# Patient Record
Sex: FEMALE | Race: WHITE | NOT HISPANIC OR LATINO | Employment: STUDENT | URBAN - METROPOLITAN AREA
[De-identification: names, ages, dates, MRNs, and addresses within clinical notes are randomized per-mention and may not be internally consistent; named-entity substitution may affect disease eponyms.]

---
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
## 2021-04-04 ENCOUNTER — HEALTH MAINTENANCE LETTER (OUTPATIENT)
Age: 21
End: 2021-04-04

## 2021-09-18 ENCOUNTER — HEALTH MAINTENANCE LETTER (OUTPATIENT)
Age: 21
End: 2021-09-18

## 2021-09-29 ENCOUNTER — VIRTUAL VISIT (OUTPATIENT)
Dept: FAMILY MEDICINE | Facility: CLINIC | Age: 21
End: 2021-09-29
Payer: COMMERCIAL

## 2021-09-29 DIAGNOSIS — N30.00 ACUTE CYSTITIS WITHOUT HEMATURIA: Primary | ICD-10-CM

## 2021-09-29 PROCEDURE — 99213 OFFICE O/P EST LOW 20 MIN: CPT | Mod: GT | Performed by: NURSE PRACTITIONER

## 2021-09-29 RX ORDER — NITROFURANTOIN 25; 75 MG/1; MG/1
100 CAPSULE ORAL 2 TIMES DAILY
Qty: 14 CAPSULE | Refills: 0 | Status: SHIPPED | OUTPATIENT
Start: 2021-09-29 | End: 2022-03-02

## 2022-02-25 ENCOUNTER — TRANSFERRED RECORDS (OUTPATIENT)
Dept: HEALTH INFORMATION MANAGEMENT | Facility: CLINIC | Age: 22
End: 2022-02-25
Payer: COMMERCIAL

## 2022-03-01 ENCOUNTER — TELEPHONE (OUTPATIENT)
Dept: FAMILY MEDICINE | Facility: CLINIC | Age: 22
End: 2022-03-01
Payer: COMMERCIAL

## 2022-03-01 NOTE — TELEPHONE ENCOUNTER
Last office visit ws on 9/29/21 for cystitis, otherwise 11/19/19 for physical.    Patient will need an office visit.  We do not have a current AILYN on file to talk to mom.  Last one was 2013 when she was a minor.     Called patient   [phone] (home).  Left message to return call on voicemail to [phone]    Called mom Rio and advised that we do not have an AILYN for her. Did not give out any information.  Just gathered information.  She asked if we could just schedule an appointment for her at [facility].    Appointment made for tomorrow.    Katelynn RN,BSN  Triage Nurse  [facility]: [facility]  Ph: [phone]

## 2022-03-01 NOTE — TELEPHONE ENCOUNTER
Mom calling, patient has herniated disc in back and seeing chiropractor. Mom asking if provider can prescribe something to help patient sleep at night(muscle relaxer). Mom will have chiropractor office fax xray reports faxed to clinic.

## 2022-03-02 ENCOUNTER — OFFICE VISIT (OUTPATIENT)
Dept: FAMILY MEDICINE | Facility: CLINIC | Age: 22
End: 2022-03-02
Payer: COMMERCIAL

## 2022-03-02 VITALS
BODY MASS INDEX: 19.21 KG/M2 | SYSTOLIC BLOOD PRESSURE: 114 MMHG | OXYGEN SATURATION: 98 % | HEIGHT: 64 IN | HEART RATE: 92 BPM | DIASTOLIC BLOOD PRESSURE: 70 MMHG | TEMPERATURE: 98.5 F | RESPIRATION RATE: 15 BRPM | WEIGHT: 112.5 LBS

## 2022-03-02 DIAGNOSIS — M54.42 CHRONIC BILATERAL LOW BACK PAIN WITH LEFT-SIDED SCIATICA: Primary | ICD-10-CM

## 2022-03-02 DIAGNOSIS — G89.29 CHRONIC BILATERAL LOW BACK PAIN WITH LEFT-SIDED SCIATICA: Primary | ICD-10-CM

## 2022-03-02 PROCEDURE — 99213 OFFICE O/P EST LOW 20 MIN: CPT | Performed by: PHYSICIAN ASSISTANT

## 2022-03-02 NOTE — PATIENT INSTRUCTIONS
For the first few days, use ice several times a day  - after that, can use heat in the morning, ice at night  Exercises and physical therapy referral, continue with chiropractor  - stretching and core strength  Ibuprofen 600-800 mg up to 3 times a day  Tizanidine muscle relaxer - can make you sleepy. Try at night.  Follow up if worsening or if not improving     Let me know if not improving, consider MRI  Please follow up sooner if symptoms worsen

## 2022-03-02 NOTE — PROGRESS NOTES
Assessment & Plan     ASSESSMENT/PLAN:      ICD-10-CM    1. Chronic bilateral low back pain with left-sided sciatica  M54.42 Physical Therapy Referral    G89.29 tiZANidine (ZANAFLEX) 4 MG tablet     Reviewed chiropractic notes, will get scanned into the system.  She would like to continue working with chiropractor, is also willing to trial physical therapy due to the muscular pain. We discussed spine specialist follow up, MRI- she defers these today but will let me know if she would like to pursue this (discussed MRI questions, all negative)    Patient Instructions   For the first few days, use ice several times a day  - after that, can use heat in the morning, ice at night  Exercises and physical therapy referral, continue with chiropractor  - stretching and core strength  Ibuprofen 600-800 mg up to 3 times a day  Tizanidine muscle relaxer - can make you sleepy. Try at night.  Follow up if worsening or if not improving     Let me know if not improving, consider MRI  Please follow up sooner if symptoms worsen    Return in about 2 weeks (around 3/16/2022) for if not improving or if worsening.    SHELIA Mota [facility] BETY Covarrubias is a 21 year old who presents for the following health issues     HPI     Concern - Low back pain  Onset: She had a previous injury to back 2 years ago, but just last week she woke up with pain in middle low back that has been constant  Description: Sharp stabbing pain the is constant  Intensity: moderate, 6/10 today but has been up to 8/10  Progression of Symptoms:  improving slightly  Accompanying Signs & Symptoms: Pain that radiates down into both legs  Previous history of similar problem: Yes from previous back injury 2 years ago  Precipitating factors:        Worsened by: Sitting has been the worst, not able to bend down to lift anything, twisting and bending  Alleviating factors:        Improved by: Ice and ibuprofen with little  "relief  Therapies tried and outcome:  Ice and ibuprofen with little relief    She picked up a duffel and felt immediate pain/pop, had similar back/leg pain at the time. She has flares of pain about once a month since then, but not this bad. . If she sits too long always has sciatica - bilateral, down to knees sometimes to toes on side of leg, more pain than tingling.  Saw chiro last week, had x-ray - they told her L1 herniation  This flare she just woke up pain, slept wrong.    She also notes muscle spasms    Patient denies red flag signs such as bowel/bladder changes, paresthesias in groin, radiation, weakness, history of cancer or IV drug use       Review of Systems   Other than noted above, general, HEENT, respiratory, cardiac, MS, and gastrointestinal systems are negative.       Objective    /70   Pulse 92   Temp 98.5  F (36.9  C) (Tympanic)   Resp 15   Ht 1.613 m (5' 3.5\")   Wt 51 kg (112 lb 8 oz)   SpO2 98%   BMI 19.61 kg/m    Body mass index is 19.61 kg/m .  Physical Exam   GENERAL: healthy, alert and no distress  RESP: lungs clear to auscultation - no rales, rhonchi or wheezes  CV: regular rate and rhythm, normal S1 S2, no S3 or S4, no murmur, click or rub, no peripheral edema and peripheral pulses strong  ABDOMEN: soft, nontender, no hepatosplenomegaly, no masses and bowel sounds normal  MS: no gross musculoskeletal defects noted, no edema  Comprehensive back pain exam:  Tenderness of L1 area over spine. left sided upper paralumbar muscles, Pain limits the following motions: all, Lower extremity strength functional and equal on both sides, Lower extremity reflexes within normal limits bilaterally, Lower extremity sensation normal and equal on both sides and Straight leg raise negative bilaterally            "

## 2022-04-30 ENCOUNTER — HEALTH MAINTENANCE LETTER (OUTPATIENT)
Age: 22
End: 2022-04-30

## 2022-06-01 ENCOUNTER — TELEPHONE (OUTPATIENT)
Dept: FAMILY MEDICINE | Facility: CLINIC | Age: 22
End: 2022-06-01
Payer: COMMERCIAL

## 2022-06-01 NOTE — TELEPHONE ENCOUNTER
Patient Quality Outreach      Summary:    Patient has the following on her problem list/HM: None    Patient is due/failing the following:   Physical  - Due Now  Chlamydia    Type of outreach:    Sent [employer] message.    Questions for provider review:    None                                                                                                                                     Sarah Estevez [facility]     Chart routed to Care Team.

## 2022-06-07 NOTE — PROGRESS NOTES
Marci is a 21 year old who is being evaluated via a billable telephone visit.      What phone number would you like to be contacted at? [phone]  How would you like to obtain your AVS? Mary    Assessment & Plan     (N30.00) Acute cystitis without hematuria  (primary encounter diagnosis)  Comment: already took AZO so UA would be inaccurate. Recommend treatment for presumed UTI.  Plan: nitroFURantoin macrocrystal-monohydrate         (MACROBID) 100 MG capsule  Advised patient that if symptoms do not improve or worsen to call the clinic back and she will need to provide a urine.      Prescription drug management  10 minutes spent on the date of the encounter doing chart review, history and exam, documentation and further activities per the note         No follow-ups on file.    GEORGE Tovar [facility]    Debbie Covarrubias is a 21 year old who presents for the following health issues     HPI     Genitourinary - Female  Onset/Duration: since yesterday   Description:   Painful urination (Dysuria): YES           Frequency: YES  Blood in urine (Hematuria): no  Delay in urine (Hesitency): no  Intensity: moderate  Progression of Symptoms:  improving  Accompanying Signs & Symptoms:  Fever/chills: no  Flank pain: no  Nausea and vomiting: no  Vaginal symptoms: none  Abdominal/Pelvic Pain: no  History:   History of frequent UTI s: no  History of kidney stones: no  Sexually Active: YES  Possibility of pregnancy: No  Precipitating or alleviating factors: AZO makes symptoms better   Therapies tried and outcome: Cranberry juice prn (contraindicated in Coumadin patients) and  AZO    No fever or flank pain.     Review of Systems   Constitutional, HEENT, cardiovascular, pulmonary, gi and gu systems are negative, except as otherwise noted.      Objective           Vitals:  No vitals were obtained today due to virtual visit.    Physical Exam   healthy, alert and no distress  PSYCH:  Alert and oriented times 3; coherent speech, normal   rate and volume, able to articulate logical thoughts, able   to abstract reason, no tangential thoughts, no hallucinations   or delusions  Her affect is normal  RESP: No cough, no audible wheezing, able to talk in full sentences  Remainder of exam unable to be completed due to telephone visits    Office Visit on 11/19/2019   Component Date Value Ref Range Status     Specimen Descrip 11/19/2019 Urine   Final     N Gonorrhea PCR 11/19/2019 Negative  NEG^Negative Final    Comment: Negative for N. gonorrhoeae rRNA by transcription mediated amplification.  A negative result by transcription mediated amplification does not preclude   the presence of N. gonorrhoeae infection because results are dependent on   proper and adequate collection, absence of inhibitors, and sufficient rRNA to   be detected.       Specimen Description 11/19/2019 Urine   Final     Chlamydia Trachomatis PCR 11/19/2019 Negative  NEG^Negative Final    Comment: Negative for C. trachomatis rRNA by transcription mediated amplification.  A negative result by transcription mediated amplification does not preclude   the presence of C. trachomatis infection because results are dependent on   proper and adequate collection, absence of inhibitors, and sufficient rRNA to   be detected.                   Phone call duration: 5 minutes   Traveling alone Billing Type: Third-Party Bill Bill For Surgical Tray: no Performing Laboratory: [phone] Expected Date Of Service: 06/07/2022

## 2022-11-20 ENCOUNTER — HEALTH MAINTENANCE LETTER (OUTPATIENT)
Age: 22
End: 2022-11-20

## 2022-11-30 ENCOUNTER — NURSE TRIAGE (OUTPATIENT)
Dept: NURSING | Facility: CLINIC | Age: 22
End: 2022-11-30

## 2022-11-30 ENCOUNTER — OFFICE VISIT (OUTPATIENT)
Dept: FAMILY MEDICINE | Facility: CLINIC | Age: 22
End: 2022-11-30
Payer: COMMERCIAL

## 2022-11-30 VITALS
DIASTOLIC BLOOD PRESSURE: 82 MMHG | WEIGHT: 128.9 LBS | HEART RATE: 113 BPM | SYSTOLIC BLOOD PRESSURE: 124 MMHG | RESPIRATION RATE: 26 BRPM | TEMPERATURE: 101.7 F | OXYGEN SATURATION: 95 % | BODY MASS INDEX: 22.47 KG/M2

## 2022-11-30 DIAGNOSIS — J02.9 SORE THROAT: ICD-10-CM

## 2022-11-30 DIAGNOSIS — J10.1 INFLUENZA A: Primary | ICD-10-CM

## 2022-11-30 LAB
DEPRECATED S PYO AG THROAT QL EIA: NEGATIVE
FLUAV AG SPEC QL IA: POSITIVE
FLUBV AG SPEC QL IA: NEGATIVE
GROUP A STREP BY PCR: NOT DETECTED

## 2022-11-30 PROCEDURE — 87804 INFLUENZA ASSAY W/OPTIC: CPT | Mod: 59 | Performed by: FAMILY MEDICINE

## 2022-11-30 PROCEDURE — 87804 INFLUENZA ASSAY W/OPTIC: CPT | Performed by: FAMILY MEDICINE

## 2022-11-30 PROCEDURE — 87651 STREP A DNA AMP PROBE: CPT | Performed by: FAMILY MEDICINE

## 2022-11-30 PROCEDURE — 99213 OFFICE O/P EST LOW 20 MIN: CPT | Performed by: FAMILY MEDICINE

## 2022-11-30 NOTE — PATIENT INSTRUCTIONS
You have influenza A. Also known as the flu.    Tylenol and ibuprofen for fever/aches    Should improve in the period of 3-5 days typically  Fluids and rest and time.    I will call if strep positive.

## 2022-11-30 NOTE — PROGRESS NOTES
Assessment & Plan     Sore throat  pending  - Streptococcus A Rapid Screen w/Reflex to PCR - Clinic Collect    Influenza A  positive             No follow-ups on file.    Bay Johnston MD  [facility] AIDA Covarrubias is a 22 year old female who presents to clinic today for the following health issues:  Chief Complaint   Patient presents with     Cough     X Monday. Congestion, SOB, chest pain/tightness. Vomited in lobby earlier.      Ear Problem     Both ears x yesterday. No drainage. Headaches.     Fever     On/off x Monday. Chills and body aches. Ibuprofen taken at 7 AM today.     HPI    Here with fever and cough and ST.  Started Monday.  Ibuprofen. Cold and flu medicine.  Headache/ear pain.  Cough drops.        Review of Systems        Objective    /82 (BP Location: Right arm, Patient Position: Sitting, Cuff Size: Adult Regular)   Pulse 113   Temp (!) 101.7  F (38.7  C) (Oral)   Resp 26   Wt 58.5 kg (128 lb 14.4 oz)   LMP 11/05/2022 (Exact Date)   SpO2 95%   BMI 22.47 kg/m    Physical Exam  Vitals and nursing note reviewed.   Constitutional:       Appearance: She is diaphoretic.   HENT:      Right Ear: Tympanic membrane normal.      Left Ear: Tympanic membrane normal.      Mouth/Throat:      Mouth: Mucous membranes are moist.      Pharynx: Posterior oropharyngeal erythema present.   Eyes:      Pupils: Pupils are equal, round, and reactive to light.   Cardiovascular:      Rate and Rhythm: Normal rate and regular rhythm.      Pulses: Normal pulses.      Heart sounds: Normal heart sounds.   Pulmonary:      Effort: Pulmonary effort is normal.      Breath sounds: Normal breath sounds.   Neurological:      Mental Status: She is alert.

## 2022-11-30 NOTE — TELEPHONE ENCOUNTER
Mom calling. Patient heard on speaker phone. She's been sick started with sore throat, cough. Yesterday fever, body aches, lungs on fire. Temp now 99.1 throat's on fire, ear ache. No more diarrhea but used Imodium. I connected with scheduling for an appointment and advised urgent care if they can't get her in.  Kiley Charles RN  [facility]        Reason for Disposition    Earache  (Exceptions: brief ear pain of < 60 minutes duration, earache occurring during air travel    Additional Information    Negative: Moving the earlobe or touching the ear clearly increases the pain    Negative: Foreign body struck in the ear (e.g., bug, piece of cotton)    Negative: Followed an ear injury    Negative: [1] Recently diagnosed with otitis media AND [2] currently on oral antibiotics    Negative: [1] Stiff neck (can't touch chin to chest) AND [2] fever    Negative: [1] Bony area of skull behind the ear is pink or swollen AND [2] fever    Negative: Fever > 104 F (40 C)    Negative: Patient sounds very sick or weak to the triager    Negative: [1] SEVERE pain AND [2] not improved 2 hours after taking analgesic medication (e.g., ibuprofen or acetaminophen)     Pain at 7    Negative: Walking is very unsteady or feels very dizzy    Negative: Sudden onset of ear pain after long - thin object was inserted into the ear canal (e.g., pencil, Q-tip)    Negative: Diabetes mellitus or weak immune system (e.g., HIV positive, cancer chemo, splenectomy, organ transplant, chronic steroids)    Negative: New blurred vision or vision changes    Negative: White, yellow, or green discharge    Negative: Bloody discharge or unexplained bleeding from ear canal    Protocols used: EARACHE-A-AH

## 2023-03-15 ENCOUNTER — OFFICE VISIT (OUTPATIENT)
Dept: FAMILY MEDICINE | Facility: CLINIC | Age: 23
End: 2023-03-15
Payer: COMMERCIAL

## 2023-03-15 VITALS
SYSTOLIC BLOOD PRESSURE: 110 MMHG | BODY MASS INDEX: 21.17 KG/M2 | HEIGHT: 64 IN | TEMPERATURE: 98.2 F | DIASTOLIC BLOOD PRESSURE: 72 MMHG | RESPIRATION RATE: 16 BRPM | HEART RATE: 84 BPM | OXYGEN SATURATION: 98 % | WEIGHT: 124 LBS

## 2023-03-15 DIAGNOSIS — R00.2 HEART PALPITATIONS: ICD-10-CM

## 2023-03-15 DIAGNOSIS — Z00.00 HEALTH CARE MAINTENANCE: Primary | ICD-10-CM

## 2023-03-15 PROCEDURE — 99213 OFFICE O/P EST LOW 20 MIN: CPT | Mod: 25 | Performed by: NURSE PRACTITIONER

## 2023-03-15 PROCEDURE — 90471 IMMUNIZATION ADMIN: CPT | Performed by: NURSE PRACTITIONER

## 2023-03-15 PROCEDURE — 90715 TDAP VACCINE 7 YRS/> IM: CPT | Performed by: NURSE PRACTITIONER

## 2023-03-15 PROCEDURE — 93000 ELECTROCARDIOGRAM COMPLETE: CPT | Performed by: NURSE PRACTITIONER

## 2023-03-15 PROCEDURE — 99395 PREV VISIT EST AGE 18-39: CPT | Mod: 25 | Performed by: NURSE PRACTITIONER

## 2023-03-15 ASSESSMENT — PAIN SCALES - GENERAL: PAINLEVEL: NO PAIN (0)

## 2023-03-15 ASSESSMENT — ENCOUNTER SYMPTOMS
PARESTHESIAS: 0
ABDOMINAL PAIN: 0
CHILLS: 0
PALPITATIONS: 1
CONSTIPATION: 0
JOINT SWELLING: 0
HEARTBURN: 0
HEMATOCHEZIA: 0
BREAST MASS: 0
NAUSEA: 0
DIZZINESS: 0
FREQUENCY: 0
FEVER: 0
MYALGIAS: 0
DYSURIA: 0
NERVOUS/ANXIOUS: 0
SORE THROAT: 0
DIARRHEA: 0
HEMATURIA: 0
HEADACHES: 0
EYE PAIN: 0
SHORTNESS OF BREATH: 0
WEAKNESS: 0
ARTHRALGIAS: 0
COUGH: 0

## 2023-03-15 ASSESSMENT — ANXIETY QUESTIONNAIRES
GAD7 TOTAL SCORE: 7
2. NOT BEING ABLE TO STOP OR CONTROL WORRYING: SEVERAL DAYS
1. FEELING NERVOUS, ANXIOUS, OR ON EDGE: SEVERAL DAYS
3. WORRYING TOO MUCH ABOUT DIFFERENT THINGS: SEVERAL DAYS
GAD7 TOTAL SCORE: 7
5. BEING SO RESTLESS THAT IT IS HARD TO SIT STILL: SEVERAL DAYS
6. BECOMING EASILY ANNOYED OR IRRITABLE: SEVERAL DAYS
7. FEELING AFRAID AS IF SOMETHING AWFUL MIGHT HAPPEN: SEVERAL DAYS

## 2023-03-15 ASSESSMENT — PATIENT HEALTH QUESTIONNAIRE - PHQ9: 5. POOR APPETITE OR OVEREATING: SEVERAL DAYS

## 2023-03-15 NOTE — ASSESSMENT & PLAN NOTE
For past 6 months, HR can be up to 130s and last 4 hours. Slight shortness of breath with it, anxiety.   Holter monitor and EKG

## 2023-03-15 NOTE — PROGRESS NOTES
SUBJECTIVE:   CC: Marci is an 22 year old who presents for preventive health visit.   Patient has been advised of split billing requirements and indicates understanding: Yes  Healthy Habits:     Getting at least 3 servings of Calcium per day:  Yes    Bi-annual eye exam:  NO    Dental care twice a year:  Yes    Sleep apnea or symptoms of sleep apnea:  None    Diet:  Regular (no restrictions)    Frequency of exercise:  2-3 days/week    Duration of exercise:  15-30 minutes    Taking medications regularly:  Yes    Barriers to taking medications:  None    Medication side effects:  Not applicable    PHQ-2 Total Score: 0    Additional concerns today:  No         Heart palpatitations: intermittent episodes; can last 4 hours, HR up to 130s; a little short of breath last episode was Sunday. Took out caffeine but still has episodes of palpitations  Nutrition: everything, works in a resturant  Exercise: none   Physical active; works all day on her feet.  Stress: mange stress  Alcohol: very rare  Tobacco none  Sleep: 8 hours uninterrupted    Contraception: 1 partner in her life for past 5-6 years. Uses condoms. Has tried Depo and OCPs which she didn't like. Had difficulty taking a daily pill. She didn't like how she felt on the hormonal contraception. Discussed other options but for now she is declining the contraception.    Today's PHQ-2 Score:   PHQ-2 ( 1999 Pf[employer]) 3/15/2023   Q1: Little interest or pleasure in doing things 0   Q2: Feeling down, depressed or hopeless 0   PHQ-2 Score 0   PHQ-2 Total Score (12-17 Years)- Positive if 3 or more points; Administer PHQ-A if positive -   Q1: Little interest or pleasure in doing things Not at all   Q2: Feeling down, depressed or hopeless Not at all   PHQ-2 Score 0       Have you ever done Advance Care Planning? (For example, a Health Directive, POLST, or a discussion with a medical provider or your loved ones about your wishes): NO    Social History     Tobacco Use     Smoking  status: Never     Smokeless tobacco: Never     Tobacco comments:     currently using e-cigarette   Substance Use Topics     Alcohol use: No     Alcohol/week: 0.0 standard drinks         Alcohol Use 3/15/2023   Prescreen: >3 drinks/day or >7 drinks/week? No   No flowsheet data found.    Reviewed orders with patient.  Reviewed health maintenance and updated orders accordingly - Yes  Patient had declined Pap smear, HIV testing, Hepatitis C, gonorrhea, chlamydia testing    Breast Cancer Screening: no family history  MGF: MI, half sister has heart arythema      History of abnormal Pap smear: NO - age 21-29 PAP every 3 years recommended     Reviewed and updated as needed this visit by clinical staff    Allergies  Meds  Problems             Reviewed and updated as needed this visit by Provider      Problems            No past medical history on file.   No past surgical history on file.  OB History   No obstetric history on file.       Review of Systems   Constitutional: Negative for chills and fever.   HENT: Negative for congestion, ear pain, hearing loss and sore throat.    Eyes: Negative for pain and visual disturbance.   Respiratory: Negative for cough and shortness of breath.    Cardiovascular: Positive for palpitations. Negative for chest pain and peripheral edema.   Gastrointestinal: Negative for abdominal pain, constipation, diarrhea, heartburn, hematochezia and nausea.   Breasts:  Negative for tenderness, breast mass and discharge.   Genitourinary: Negative for dysuria, frequency, genital sores, hematuria, pelvic pain, urgency, vaginal bleeding and vaginal discharge.   Musculoskeletal: Negative for arthralgias, joint swelling and myalgias.   Skin: Negative for rash.   Neurological: Negative for dizziness, weakness, headaches and paresthesias.   Psychiatric/Behavioral: Negative for mood changes. The patient is not nervous/anxious.         OBJECTIVE:   /72 (BP Location: Right arm, Patient Position: Sitting,  "Cuff Size: Adult Regular)   Pulse 84   Temp 98.2  F (36.8  C) (Tympanic)   Resp 16   Ht 1.613 m (5' 3.5\")   Wt 56.2 kg (124 lb)   LMP 03/02/2023 (Approximate)   SpO2 98%   BMI 21.62 kg/m    Physical Exam  Vitals reviewed.   Constitutional:       General: She is not in acute distress.     Appearance: Normal appearance. She is well-developed. She is not ill-appearing.   HENT:      Head: Normocephalic and atraumatic.      Right Ear: Tympanic membrane and external ear normal.      Left Ear: Tympanic membrane and external ear normal.      Nose: Nose normal. No rhinorrhea.      Mouth/Throat:      Mouth: Mucous membranes are moist.      Pharynx: No oropharyngeal exudate.   Eyes:      General:         Right eye: No discharge.         Left eye: No discharge.      Extraocular Movements: Extraocular movements intact.      Conjunctiva/sclera: Conjunctivae normal.   Neck:      Thyroid: No thyromegaly.      Trachea: No tracheal deviation.   Cardiovascular:      Rate and Rhythm: Normal rate and regular rhythm.      Pulses: Normal pulses.      Heart sounds: Normal heart sounds, S1 normal and S2 normal. No murmur heard.    No friction rub. No S3 or S4 sounds.   Pulmonary:      Effort: Pulmonary effort is normal. No respiratory distress.      Breath sounds: Normal breath sounds. No wheezing or rales.   Chest:   Breasts:     Right: No inverted nipple, mass, nipple discharge or skin change.      Left: No inverted nipple, mass, nipple discharge or skin change.   Abdominal:      General: Bowel sounds are normal.      Palpations: Abdomen is soft. There is no mass.   Genitourinary:     Labia:         Right: No rash.         Left: No rash.       Vagina: Normal.      Cervix: Normal.   Musculoskeletal:         General: Normal range of motion.      Cervical back: Normal range of motion and neck supple.      Right lower leg: No edema.      Left lower leg: No edema.   Lymphadenopathy:      Cervical: No cervical adenopathy.      Upper " Body:      Right upper body: No supraclavicular or axillary adenopathy.      Left upper body: No supraclavicular or axillary adenopathy.   Skin:     General: Skin is warm and dry.      Capillary Refill: Capillary refill takes less than 2 seconds.      Findings: No rash.   Neurological:      General: No focal deficit present.      Mental Status: She is alert and oriented to person, place, and time.      Motor: No abnormal muscle tone.      Deep Tendon Reflexes: Reflexes are normal and symmetric.   Psychiatric:         Mood and Affect: Mood normal.         Behavior: Behavior normal.         Thought Content: Thought content normal.         Judgment: Judgment normal.           ASSESSMENT/PLAN:     Problem List Items Addressed This Visit        Circulatory    Heart palpitations     For past 6 months, HR can be up to 130s and last 4 hours. Slight shortness of breath with it, anxiety.   Holter monitor and EKG         Relevant Orders    Adult Leadless EKG Monitor 3 to 7 Days    EKG 12-lead complete w/read - Clinics (Completed)       Other    Health care maintenance - Primary     Patient has declined Pap smear, HIV testing, Hepatitic C, gonorrhea, chlamydia testing  Uses condoms for contraception  No family history of breast cancer  Family history of MGF with MI, half sister with a heart arhythmia             Patient has been advised of split billing requirements and indicates understanding: Yes      COUNSELING:  Reviewed preventive health counseling, as reflected in patient instructions       Regular exercise       Healthy diet/nutrition       Contraception       Consider Hep C screening for all patients one time for ages 18-79 years        She reports that she has never smoked. She has never used smokeless tobacco.      GEORGE Stallings CNP  [facility]

## 2023-03-15 NOTE — ASSESSMENT & PLAN NOTE
Patient has declined Pap smear, HIV testing, Hepatitic C, gonorrhea, chlamydia testing  Uses condoms for contraception  No family history of breast cancer  Family history of MGF with MI, half sister with a heart arhythmia

## 2023-03-25 ENCOUNTER — MYC MEDICAL ADVICE (OUTPATIENT)
Dept: FAMILY MEDICINE | Facility: CLINIC | Age: 23
End: 2023-03-25
Payer: COMMERCIAL

## 2023-03-27 NOTE — TELEPHONE ENCOUNTER
Patient called back.   Has been having the palpitations constantly for 2 days.   No chest pain or shortness of breath  No headaches  No weakness or numbness anywhere  Not brought on by anything. It starts happening randomly.  HR racing in the 100's  No new medications, supplements.   Not drinking anything with caffeine such as energy drinks, coffee, tea.     Please advise  Stephany Youssef RN on 3/27/2023 at 10:19 AM

## 2023-03-27 NOTE — TELEPHONE ENCOUNTER
RN tried to reach patient.   No answer.   LVM to call back to [phone].   Stephany Youssef RN on 3/27/2023 at 9:55 AM

## 2023-04-11 ENCOUNTER — HOSPITAL ENCOUNTER (OUTPATIENT)
Dept: CARDIOLOGY | Facility: CLINIC | Age: 23
Discharge: HOME | End: 2023-04-11
Attending: NURSE PRACTITIONER | Admitting: NURSE PRACTITIONER
Payer: COMMERCIAL

## 2023-04-11 DIAGNOSIS — R00.2 HEART PALPITATIONS: ICD-10-CM

## 2023-04-11 PROCEDURE — 93244 EXT ECG>48HR<7D REV&INTERPJ: CPT | Performed by: INTERNAL MEDICINE

## 2023-04-11 PROCEDURE — 93242 EXT ECG>48HR<7D RECORDING: CPT

## 2023-06-11 ENCOUNTER — NURSE TRIAGE (OUTPATIENT)
Dept: NURSING | Facility: CLINIC | Age: 23
End: 2023-06-11

## 2023-06-11 NOTE — TELEPHONE ENCOUNTER
Patient calling requesting to have medication from her e-visit sent to [facility] original pharmacy is closed today. Resent to requested new pharmacy in original e visit encounter.      Tyra Barajas RN BSN 6/11/2023 8:50 AM      Reason for Disposition    Health Information question, no triage required and triager able to answer question    Additional Information    Negative: [1] Caller is not with the adult (patient) AND [2] reporting urgent symptoms    Negative: Lab result questions    Negative: Medication questions    Negative: Caller can't be reached by phone    Negative: Caller has already spoken to PCP or another triager    Negative: RN needs further essential information from caller in order to complete triage    Negative: Requesting regular office appointment    Negative: [1] Caller requesting NON-URGENT health information AND [2] PCP's office is the best resource    Protocols used: INFORMATION ONLY CALL - [facility]AGE[facility]

## 2024-06-22 ENCOUNTER — HEALTH MAINTENANCE LETTER (OUTPATIENT)
Age: 24
End: 2024-06-22

## 2024-12-06 NOTE — NURSING NOTE
"Chief Complaint   Patient presents with     Contraception       Initial /74  Pulse 107  Temp 100  F (37.8  C) (Tympanic)  Ht 5' 2.72\" (1.593 m)  Wt 111 lb (50.3 kg)  LMP 10/18/2017 (Approximate)  Breastfeeding? No  BMI 19.84 kg/m2 Estimated body mass index is 19.84 kg/(m^2) as calculated from the following:    Height as of this encounter: 5' 2.72\" (1.593 m).    Weight as of this encounter: 111 lb (50.3 kg).  Medication Reconciliation: complete     Monie Rondon MA      " yes...

## 2025-07-12 ENCOUNTER — HEALTH MAINTENANCE LETTER (OUTPATIENT)
Age: 25
End: 2025-07-12